# Patient Record
Sex: MALE | Race: OTHER | Employment: FULL TIME | URBAN - METROPOLITAN AREA
[De-identification: names, ages, dates, MRNs, and addresses within clinical notes are randomized per-mention and may not be internally consistent; named-entity substitution may affect disease eponyms.]

---

## 2017-05-16 ENCOUNTER — TELEPHONE (OUTPATIENT)
Dept: SURGERY | Facility: CLINIC | Age: 42
End: 2017-05-16

## 2017-05-16 ENCOUNTER — OFFICE VISIT (OUTPATIENT)
Dept: SURGERY | Facility: CLINIC | Age: 42
End: 2017-05-16

## 2017-05-16 ENCOUNTER — APPOINTMENT (OUTPATIENT)
Dept: LAB | Facility: HOSPITAL | Age: 42
End: 2017-05-16
Attending: UROLOGY
Payer: COMMERCIAL

## 2017-05-16 ENCOUNTER — LAB ENCOUNTER (OUTPATIENT)
Dept: LAB | Facility: HOSPITAL | Age: 42
End: 2017-05-16
Attending: UROLOGY
Payer: COMMERCIAL

## 2017-05-16 VITALS
HEART RATE: 96 BPM | BODY MASS INDEX: 45.91 KG/M2 | WEIGHT: 310 LBS | DIASTOLIC BLOOD PRESSURE: 90 MMHG | RESPIRATION RATE: 16 BRPM | SYSTOLIC BLOOD PRESSURE: 138 MMHG | TEMPERATURE: 98 F | HEIGHT: 69 IN

## 2017-05-16 DIAGNOSIS — E66.01 MORBID OBESITY WITH BMI OF 45.0-49.9, ADULT (HCC): ICD-10-CM

## 2017-05-16 DIAGNOSIS — Z01.818 PREOP EXAMINATION: ICD-10-CM

## 2017-05-16 DIAGNOSIS — R35.1 NOCTURIA: ICD-10-CM

## 2017-05-16 DIAGNOSIS — R35.1 NOCTURIA: Primary | ICD-10-CM

## 2017-05-16 DIAGNOSIS — N47.1 PHIMOSIS: ICD-10-CM

## 2017-05-16 DIAGNOSIS — G47.30 SLEEP APNEA, UNSPECIFIED TYPE: ICD-10-CM

## 2017-05-16 PROCEDURE — 85025 COMPLETE CBC W/AUTO DIFF WBC: CPT

## 2017-05-16 PROCEDURE — 81001 URINALYSIS AUTO W/SCOPE: CPT

## 2017-05-16 PROCEDURE — 36415 COLL VENOUS BLD VENIPUNCTURE: CPT

## 2017-05-16 PROCEDURE — 93005 ELECTROCARDIOGRAM TRACING: CPT

## 2017-05-16 PROCEDURE — 80048 BASIC METABOLIC PNL TOTAL CA: CPT

## 2017-05-16 PROCEDURE — 99244 OFF/OP CNSLTJ NEW/EST MOD 40: CPT | Performed by: UROLOGY

## 2017-05-16 PROCEDURE — 99212 OFFICE O/P EST SF 10 MIN: CPT | Performed by: UROLOGY

## 2017-05-16 PROCEDURE — 93010 ELECTROCARDIOGRAM REPORT: CPT | Performed by: UROLOGY

## 2017-05-16 RX ORDER — LISINOPRIL AND HYDROCHLOROTHIAZIDE 12.5; 1 MG/1; MG/1
TABLET ORAL
Refills: 2 | COMMUNITY
Start: 2017-05-02

## 2017-05-16 RX ORDER — CANAGLIFLOZIN AND METFORMIN HYDROCHLORIDE 50; 1000 MG/1; MG/1
TABLET, FILM COATED ORAL
COMMUNITY
Start: 2017-05-14 | End: 2019-05-01

## 2017-05-16 NOTE — PROGRESS NOTES
Nel Lopez is a 46year old male. HPI:   Patient presents with:  Circumcision    History provided by pt. 1. Voiding difficulties  His American urologic Association voiding score is  8, moderate  voiding dysfunction category.   Nothing makes prob 0.0 oz/week       0 Standard drinks or equivalent per week       Medications (Active prior to today's visit):    Current Outpatient Prescriptions:  Lisinopril-Hydrochlorothiazide 10-12.5 MG Oral Tab TK 1 T PO QD Disp:  Rfl: 2   INVOKAMET  MG Oral Ta noted  Respiratory: normal to inspection lungs are clear to auscultation bilaterally normal respiratory effort  Cardiac: regular rate and rhythm ; normal S, S2 ;   Murmurs none  Abdomen: soft, non-tender, non-distended, no organomegaly noted, no masses  Ge for general anesthesia in light of his morbid obesity, sleep apnea and DM.  I fully explained to patient the benefits, risks, complications, side effects, reasons for, nature of, alternatives to the above treatment and I answered questions on treatment; thiago procedure         Franchesca Sanchez  is a very pleasant patient and I thoroughly enjoyed evaluating him  in consultation. I thank you for sending the patient to see me.   I will do my best to keep you informed of  all significant urological findings and developments

## 2017-05-16 NOTE — TELEPHONE ENCOUNTER
Patient seen in office today scheduled for circumcision, Tuesday 06/06/17 @ 9:00, [facility]/outpatient.

## 2017-05-16 NOTE — PATIENT INSTRUCTIONS
1. Proceed with plans for circumcision under general anesthesia at [facility] will plan that you would go home the same day although there is a small chance that you may have to stay longer than expected because of your sleep apnea.

## 2017-05-17 NOTE — TELEPHONE ENCOUNTER
Spoke with [employer] representative with [employer] to obtain prior authorization, Ref# [id number] was given, also informed to fax clinicals over to [employer] at [phone], thanks

## 2017-05-30 ENCOUNTER — TELEPHONE (OUTPATIENT)
Dept: SURGERY | Facility: CLINIC | Age: 42
End: 2017-05-30

## 2017-05-30 NOTE — TELEPHONE ENCOUNTER
Phoned pt and spoke with him. Read to him Dr. Cecilia Emanuel note as outlined below in this encounter, in it's entirety. Also advised that he get letter with 's medical clearance , to provided to  , prior to procedure.   Pt verbalized understanding

## 2017-06-01 ENCOUNTER — TELEPHONE (OUTPATIENT)
Dept: SURGERY | Facility: CLINIC | Age: 42
End: 2017-06-01

## 2017-06-01 NOTE — TELEPHONE ENCOUNTER
pt called. He spoke with his PCP Dr. Wes Doherty and is calling the RN back as instructed. Please call.

## 2017-06-01 NOTE — TELEPHONE ENCOUNTER
Spoke with pt and determined that he saw Dr. Jessica Laughlin and he is sending him for an Echocardiogram on Sat. 6/3 and Dr. Jessica Laughlin would also like our office to fax over the recent pre-op labs pt had done.  I told pt that I will fax those labs and also send a msg to [address] [place] [address]

## 2017-06-03 ENCOUNTER — HOSPITAL ENCOUNTER (OUTPATIENT)
Dept: CV DIAGNOSTICS | Facility: HOSPITAL | Age: 42
Discharge: HOME | End: 2017-06-03
Attending: FAMILY MEDICINE
Payer: COMMERCIAL

## 2017-06-03 DIAGNOSIS — Z01.818 PREOP EXAMINATION: ICD-10-CM

## 2017-06-03 DIAGNOSIS — R94.31 ABNORMAL ELECTROCARDIOGRAM (ECG) (EKG): ICD-10-CM

## 2017-06-03 PROCEDURE — 93306 TTE W/DOPPLER COMPLETE: CPT | Performed by: FAMILY MEDICINE

## 2017-06-05 NOTE — TELEPHONE ENCOUNTER
per your request spoke with Vivi Odonnell at [address] [place] [address] Pkwy' office informed that I would be faxing patient' Echo results.  Thanks Leidy

## 2017-06-05 NOTE — TELEPHONE ENCOUNTER
I spoke with pt and informed him that Molly Branch showed the Echo result to [address] and he did not have a problem with the results and he asked her to fax it to Dr. Romero Mejia office also which she did.  He said that there is another Doc covering for Dr. Diana Saenz today and he

## 2017-06-05 NOTE — TELEPHONE ENCOUNTER
Tana Hammond,   as soon as possible please call hospital echocardiogram department (heart cardiac ultrasound); this patient had cardiac 2D echo (ultrasound) Saturday, 6/3/17; patient has surgery tomorrow Tuesday, 6/6/17 and the study has been red; please asked th

## 2017-06-06 ENCOUNTER — ANESTHESIA EVENT (OUTPATIENT)
Dept: SURGERY | Facility: HOSPITAL | Age: 42
End: 2017-06-06
Payer: COMMERCIAL

## 2017-06-06 ENCOUNTER — TELEPHONE (OUTPATIENT)
Dept: SURGERY | Facility: CLINIC | Age: 42
End: 2017-06-06

## 2017-06-06 ENCOUNTER — HOSPITAL ENCOUNTER (OUTPATIENT)
Facility: HOSPITAL | Age: 42
Setting detail: HOSPITAL OUTPATIENT SURGERY
Discharge: HOME | End: 2017-06-06
Attending: UROLOGY | Admitting: UROLOGY
Payer: COMMERCIAL

## 2017-06-06 ENCOUNTER — SURGERY (OUTPATIENT)
Age: 42
End: 2017-06-06

## 2017-06-06 ENCOUNTER — ANESTHESIA (OUTPATIENT)
Dept: SURGERY | Facility: HOSPITAL | Age: 42
End: 2017-06-06
Payer: COMMERCIAL

## 2017-06-06 VITALS
SYSTOLIC BLOOD PRESSURE: 133 MMHG | OXYGEN SATURATION: 95 % | WEIGHT: 303 LBS | RESPIRATION RATE: 16 BRPM | HEIGHT: 69 IN | TEMPERATURE: 99 F | BODY MASS INDEX: 44.88 KG/M2 | DIASTOLIC BLOOD PRESSURE: 65 MMHG | HEART RATE: 87 BPM

## 2017-06-06 DIAGNOSIS — N47.1 PHIMOSIS: Primary | ICD-10-CM

## 2017-06-06 PROCEDURE — 54161 CIRCUM 28 DAYS OR OLDER: CPT | Performed by: UROLOGY

## 2017-06-06 PROCEDURE — 0VTTXZZ RESECTION OF PREPUCE, EXTERNAL APPROACH: ICD-10-PCS | Performed by: UROLOGY

## 2017-06-06 RX ORDER — ACETAMINOPHEN 325 MG/1
650 TABLET ORAL ONCE
Status: COMPLETED | OUTPATIENT
Start: 2017-06-06 | End: 2017-06-06

## 2017-06-06 RX ORDER — SODIUM CHLORIDE, SODIUM LACTATE, POTASSIUM CHLORIDE, CALCIUM CHLORIDE 600; 310; 30; 20 MG/100ML; MG/100ML; MG/100ML; MG/100ML
INJECTION, SOLUTION INTRAVENOUS CONTINUOUS
Status: DISCONTINUED | OUTPATIENT
Start: 2017-06-06 | End: 2017-06-06

## 2017-06-06 RX ORDER — MORPHINE SULFATE 2 MG/ML
2 INJECTION, SOLUTION INTRAMUSCULAR; INTRAVENOUS EVERY 10 MIN PRN
Status: DISCONTINUED | OUTPATIENT
Start: 2017-06-06 | End: 2017-06-06

## 2017-06-06 RX ORDER — BUPIVACAINE HYDROCHLORIDE 5 MG/ML
INJECTION, SOLUTION EPIDURAL; INTRACAUDAL AS NEEDED
Status: DISCONTINUED | OUTPATIENT
Start: 2017-06-06 | End: 2017-06-06 | Stop reason: HOSPADM

## 2017-06-06 RX ORDER — NALOXONE HYDROCHLORIDE 0.4 MG/ML
80 INJECTION, SOLUTION INTRAMUSCULAR; INTRAVENOUS; SUBCUTANEOUS AS NEEDED
Status: DISCONTINUED | OUTPATIENT
Start: 2017-06-06 | End: 2017-06-06

## 2017-06-06 RX ORDER — HYDROCODONE BITARTRATE AND ACETAMINOPHEN 5; 325 MG/1; MG/1
2 TABLET ORAL AS NEEDED
Status: DISCONTINUED | OUTPATIENT
Start: 2017-06-06 | End: 2017-06-06

## 2017-06-06 RX ORDER — MIDAZOLAM HYDROCHLORIDE 1 MG/ML
INJECTION INTRAMUSCULAR; INTRAVENOUS AS NEEDED
Status: DISCONTINUED | OUTPATIENT
Start: 2017-06-06 | End: 2017-06-06 | Stop reason: SURG

## 2017-06-06 RX ORDER — ONDANSETRON 2 MG/ML
4 INJECTION INTRAMUSCULAR; INTRAVENOUS ONCE AS NEEDED
Status: DISCONTINUED | OUTPATIENT
Start: 2017-06-06 | End: 2017-06-06

## 2017-06-06 RX ORDER — HYDROCODONE BITARTRATE AND ACETAMINOPHEN 5; 325 MG/1; MG/1
2 TABLET ORAL EVERY 4 HOURS PRN
Status: DISCONTINUED | OUTPATIENT
Start: 2017-06-06 | End: 2017-06-06

## 2017-06-06 RX ORDER — HYDROCODONE BITARTRATE AND ACETAMINOPHEN 5; 325 MG/1; MG/1
1 TABLET ORAL EVERY 4 HOURS PRN
Status: DISCONTINUED | OUTPATIENT
Start: 2017-06-06 | End: 2017-06-06

## 2017-06-06 RX ORDER — DIAPER,BRIEF,INFANT-TODD,DISP
EACH MISCELLANEOUS AS NEEDED
Status: DISCONTINUED | OUTPATIENT
Start: 2017-06-06 | End: 2017-06-06 | Stop reason: HOSPADM

## 2017-06-06 RX ORDER — SUCCINYLCHOLINE CHLORIDE 20 MG/ML
INJECTION INTRAMUSCULAR; INTRAVENOUS AS NEEDED
Status: DISCONTINUED | OUTPATIENT
Start: 2017-06-06 | End: 2017-06-06 | Stop reason: SURG

## 2017-06-06 RX ORDER — ONDANSETRON 2 MG/ML
INJECTION INTRAMUSCULAR; INTRAVENOUS AS NEEDED
Status: DISCONTINUED | OUTPATIENT
Start: 2017-06-06 | End: 2017-06-06 | Stop reason: SURG

## 2017-06-06 RX ORDER — METOCLOPRAMIDE 10 MG/1
10 TABLET ORAL ONCE
Status: COMPLETED | OUTPATIENT
Start: 2017-06-06 | End: 2017-06-06

## 2017-06-06 RX ORDER — HYDROCODONE BITARTRATE AND ACETAMINOPHEN 5; 325 MG/1; MG/1
1 TABLET ORAL EVERY 6 HOURS PRN
Qty: 20 TABLET | Refills: 0 | Status: SHIPPED | OUTPATIENT
Start: 2017-06-06 | End: 2017-06-16

## 2017-06-06 RX ORDER — SODIUM CHLORIDE, SODIUM LACTATE, POTASSIUM CHLORIDE, CALCIUM CHLORIDE 600; 310; 30; 20 MG/100ML; MG/100ML; MG/100ML; MG/100ML
INJECTION, SOLUTION INTRAVENOUS CONTINUOUS PRN
Status: DISCONTINUED | OUTPATIENT
Start: 2017-06-06 | End: 2017-06-06 | Stop reason: SURG

## 2017-06-06 RX ORDER — HYDROCODONE BITARTRATE AND ACETAMINOPHEN 5; 325 MG/1; MG/1
1 TABLET ORAL AS NEEDED
Status: DISCONTINUED | OUTPATIENT
Start: 2017-06-06 | End: 2017-06-06

## 2017-06-06 RX ORDER — DEXAMETHASONE SODIUM PHOSPHATE 4 MG/ML
VIAL (ML) INJECTION AS NEEDED
Status: DISCONTINUED | OUTPATIENT
Start: 2017-06-06 | End: 2017-06-06 | Stop reason: SURG

## 2017-06-06 RX ORDER — FAMOTIDINE 20 MG/1
20 TABLET ORAL ONCE
Status: COMPLETED | OUTPATIENT
Start: 2017-06-06 | End: 2017-06-06

## 2017-06-06 RX ORDER — GLYCOPYRROLATE 0.2 MG/ML
INJECTION INTRAMUSCULAR; INTRAVENOUS AS NEEDED
Status: DISCONTINUED | OUTPATIENT
Start: 2017-06-06 | End: 2017-06-06 | Stop reason: SURG

## 2017-06-06 RX ORDER — HYDROMORPHONE HYDROCHLORIDE 1 MG/ML
0.6 INJECTION, SOLUTION INTRAMUSCULAR; INTRAVENOUS; SUBCUTANEOUS EVERY 5 MIN PRN
Status: DISCONTINUED | OUTPATIENT
Start: 2017-06-06 | End: 2017-06-06

## 2017-06-06 RX ORDER — ROCURONIUM BROMIDE 10 MG/ML
INJECTION, SOLUTION INTRAVENOUS AS NEEDED
Status: DISCONTINUED | OUTPATIENT
Start: 2017-06-06 | End: 2017-06-06 | Stop reason: SURG

## 2017-06-06 RX ORDER — HYDROMORPHONE HYDROCHLORIDE 1 MG/ML
0.2 INJECTION, SOLUTION INTRAMUSCULAR; INTRAVENOUS; SUBCUTANEOUS EVERY 5 MIN PRN
Status: DISCONTINUED | OUTPATIENT
Start: 2017-06-06 | End: 2017-06-06

## 2017-06-06 RX ORDER — MORPHINE SULFATE 10 MG/ML
6 INJECTION, SOLUTION INTRAMUSCULAR; INTRAVENOUS EVERY 10 MIN PRN
Status: DISCONTINUED | OUTPATIENT
Start: 2017-06-06 | End: 2017-06-06

## 2017-06-06 RX ORDER — ACETAMINOPHEN 325 MG/1
650 TABLET ORAL EVERY 4 HOURS PRN
Status: DISCONTINUED | OUTPATIENT
Start: 2017-06-06 | End: 2017-06-06

## 2017-06-06 RX ORDER — LIDOCAINE HYDROCHLORIDE 10 MG/ML
INJECTION, SOLUTION EPIDURAL; INFILTRATION; INTRACAUDAL; PERINEURAL AS NEEDED
Status: DISCONTINUED | OUTPATIENT
Start: 2017-06-06 | End: 2017-06-06 | Stop reason: SURG

## 2017-06-06 RX ORDER — HYDROMORPHONE HYDROCHLORIDE 1 MG/ML
0.4 INJECTION, SOLUTION INTRAMUSCULAR; INTRAVENOUS; SUBCUTANEOUS EVERY 5 MIN PRN
Status: DISCONTINUED | OUTPATIENT
Start: 2017-06-06 | End: 2017-06-06

## 2017-06-06 RX ORDER — MORPHINE SULFATE 4 MG/ML
4 INJECTION, SOLUTION INTRAMUSCULAR; INTRAVENOUS EVERY 10 MIN PRN
Status: DISCONTINUED | OUTPATIENT
Start: 2017-06-06 | End: 2017-06-06

## 2017-06-06 RX ADMIN — SUCCINYLCHOLINE CHLORIDE 160 MG: 20 INJECTION INTRAMUSCULAR; INTRAVENOUS at 10:41:00

## 2017-06-06 RX ADMIN — GLYCOPYRROLATE 0.2 MG: 0.2 INJECTION INTRAMUSCULAR; INTRAVENOUS at 10:41:00

## 2017-06-06 RX ADMIN — DEXAMETHASONE SODIUM PHOSPHATE 4 MG: 4 MG/ML VIAL (ML) INJECTION at 10:40:00

## 2017-06-06 RX ADMIN — MIDAZOLAM HYDROCHLORIDE 2 MG: 1 INJECTION INTRAMUSCULAR; INTRAVENOUS at 10:41:00

## 2017-06-06 RX ADMIN — SODIUM CHLORIDE, SODIUM LACTATE, POTASSIUM CHLORIDE, CALCIUM CHLORIDE: 600; 310; 30; 20 INJECTION, SOLUTION INTRAVENOUS at 12:46:00

## 2017-06-06 RX ADMIN — SODIUM CHLORIDE, SODIUM LACTATE, POTASSIUM CHLORIDE, CALCIUM CHLORIDE: 600; 310; 30; 20 INJECTION, SOLUTION INTRAVENOUS at 10:41:00

## 2017-06-06 RX ADMIN — LIDOCAINE HYDROCHLORIDE 50 MG: 10 INJECTION, SOLUTION EPIDURAL; INFILTRATION; INTRACAUDAL; PERINEURAL at 10:41:00

## 2017-06-06 RX ADMIN — ROCURONIUM BROMIDE 10 MG: 10 INJECTION, SOLUTION INTRAVENOUS at 10:41:00

## 2017-06-06 RX ADMIN — ONDANSETRON 4 MG: 2 INJECTION INTRAMUSCULAR; INTRAVENOUS at 10:40:00

## 2017-06-06 NOTE — ANESTHESIA POSTPROCEDURE EVALUATION
Patient: Chelsy Marrero    Procedure Summary     Date Anesthesia Start Anesthesia Stop Room / Location    06/06/17 1037  300 [facility] MAIN OR 16 / 300 [facility] MAIN OR       Procedure Diagnosis Surgeon Responsible Provider    CIRCUMCISION ADULT (N/A Penis) (Circumcision (

## 2017-06-06 NOTE — INTERVAL H&P NOTE
Pre-op Diagnosis: Circumcision (phimosis)     The above referenced H&P was reviewed by Karlie Boone MD on 6/6/2017, the patient was examined and no significant changes have occurred in the patient's condition since the H&P was performed.   I discussed

## 2017-06-06 NOTE — TELEPHONE ENCOUNTER
I performed circumcision today under general anesthesia. The following are discharge instructions =    1. Patient to apply bacitracin ointment around the incision line 4 times a day for 10 days   2.  A few times every day, to use his hand on each side of p

## 2017-06-06 NOTE — H&P
Progress Notes by Suleiman Kelly MD at 5/16/2017  4:24 PM      Author: Suleiman Kelly MD Service: (none) Author Type: Physician     Filed: 5/22/2017  1:05 PM Note Time: 5/16/2017  4:24 PM Status: Signed     : Suleiman Kelly MD (Physician) HISTORY:  Past Medical History    Diagnosis  Date    •  Essential hypertension      •  Diabetes (HCC)          No past surgical history on file.    Family History    Problem  Relation  Age of Onset    [de-identified] [place] Mother      •  Diabetes  Brother    wheezing        PHYSICAL EXAM:    Constitutional: appears well hydrated alert and responsive no acute distress noted  Neurological: Oriented to time, place, person with normal affect  Exam appropriate for age; patellar reflexes diminished 1/2+ bilaterally foreskin. He recalls episode where foreskin split and bled. From hx pt gives there is suspicion of preceeding fungal infection in light of his diabetes. Severe himosis observed on physical examination; I was unable to retract foreskin at all.  Discussed lupillo midnight, night before the procedure    5.  Please shave the penis, the area around the penis, all of the pubic area when you wake up on the morning of the procedure--use a mirror    6.  The waking up at night/nocturia and also avoiding problems, urinalysi

## 2017-06-06 NOTE — ANESTHESIA PREPROCEDURE EVALUATION
Anesthesia PreOp Note    HPI:     Scar Betancourt is a 46year old male who presents for preoperative consultation requested by: Sharon Aguilera MD    Date of Surgery: 6/6/2017    Procedure(s):  CIRCUMCISION ADULT  Indication: Circumcision (phimosis) MCH 30.9 05/16/2017   MCHC 33.3 05/16/2017   RDW 13.8 05/16/2017    05/16/2017   MPV 10.1 05/16/2017       Lab Results  Component Value Date    05/16/2017   K 4.2 05/16/2017   CL 99 05/16/2017   CO2 26 05/16/2017   BUN 13 05/16/2017   CREATS

## 2017-06-06 NOTE — OPERATIVE REPORT
[facility]    PATIENT'S NAME: Clinton Brunilda   ATTENDING PHYSICIAN: Lee Ram MD   OPERATING PHYSICIAN: Lee Ram MD   PATIENT ACCOUNT#:   [id number]    LOCATION:  [facility] [address] PACU [facility] 10  MEDICAL RECORD #:   [id number] pubic fat pad and retracted it upward, and this was done in sterile technique. This allowed the penis to protrude more so we could perform the operation. The foreskin could not be pulled back at all.   I made an incision in the scar of the opening of the

## 2019-04-17 ENCOUNTER — TELEPHONE (OUTPATIENT)
Dept: SURGERY | Facility: CLINIC | Age: 44
End: 2019-04-17

## 2019-04-29 ENCOUNTER — TELEPHONE (OUTPATIENT)
Dept: SURGERY | Facility: CLINIC | Age: 44
End: 2019-04-29

## 2019-04-29 NOTE — TELEPHONE ENCOUNTER
4/29/19 @ 2:27pm  Spoke to Mahesh at [place], [phone], [id number]. He verified that patient has following benefits for Bariatric services:   · No weight management criteria. · No HAFSA/Blue Distinction required.    · PHUONG (THU#[id number]) DX E66.01   · Die

## 2019-05-01 ENCOUNTER — OFFICE VISIT (OUTPATIENT)
Dept: SURGERY | Facility: CLINIC | Age: 44
End: 2019-05-01
Payer: COMMERCIAL

## 2019-05-01 ENCOUNTER — DOCUMENTATION ONLY (OUTPATIENT)
Dept: SURGERY | Facility: CLINIC | Age: 44
End: 2019-05-01

## 2019-05-01 VITALS
BODY MASS INDEX: 47.19 KG/M2 | HEIGHT: 68.5 IN | SYSTOLIC BLOOD PRESSURE: 133 MMHG | DIASTOLIC BLOOD PRESSURE: 75 MMHG | WEIGHT: 315 LBS | RESPIRATION RATE: 16 BRPM | HEART RATE: 87 BPM

## 2019-05-01 DIAGNOSIS — E11.8 TYPE 2 DIABETES MELLITUS WITH COMPLICATION, WITHOUT LONG-TERM CURRENT USE OF INSULIN (HCC): ICD-10-CM

## 2019-05-01 DIAGNOSIS — I10 HYPERTENSION, UNSPECIFIED TYPE: ICD-10-CM

## 2019-05-01 DIAGNOSIS — E66.01 MORBID OBESITY WITH BMI OF 45.0-49.9, ADULT (HCC): Primary | ICD-10-CM

## 2019-05-01 PROBLEM — E11.9 DIABETES (HCC): Status: ACTIVE | Noted: 2019-05-01

## 2019-05-01 PROBLEM — E78.5 HYPERLIPIDEMIA: Status: ACTIVE | Noted: 2019-05-01

## 2019-05-01 RX ORDER — ATORVASTATIN CALCIUM 20 MG/1
20 TABLET, FILM COATED ORAL DAILY
Refills: 1 | COMMUNITY
Start: 2019-02-12

## 2019-05-01 RX ORDER — CANAGLIFLOZIN AND METFORMIN HYDROCHLORIDE 150; 1000 MG/1; MG/1
TABLET, FILM COATED, EXTENDED RELEASE ORAL DAILY
Refills: 2 | COMMUNITY
Start: 2019-02-13

## 2019-05-01 RX ORDER — DULAGLUTIDE 1.5 MG/.5ML
1.5 INJECTION, SOLUTION SUBCUTANEOUS AS NEEDED
Refills: 2 | COMMUNITY
Start: 2019-02-13

## 2019-05-01 RX ORDER — GLIMEPIRIDE 4 MG/1
4 TABLET ORAL DAILY
Refills: 1 | COMMUNITY
Start: 2019-04-22

## 2019-05-01 NOTE — H&P
[address] [address]  Dept: [phone]    5/1/2019  Bariatric New Patient Evaluation    Chief Complaint:  Morbid obesity     History of Present Illness: Smokeless tobacco: Never Used    Substance and Sexual Activity      Alcohol use: No        Alcohol/week: 0.0 oz      Drug use: No      Medications:   Current Outpatient Medications:   •  atorvastatin 20 MG Oral Tab, Take 20 mg by mouth daily. , Disp: , Rfl: pulmonary, dietician, and psychology evaluations. Baseline laboratory studies will be checked. The patient will be referred to Dr. Sabine Nair for medical bariatric care. I will see the patient again in 4 weeks to monitor their progress.   I will likely prefe

## 2019-05-01 NOTE — PROGRESS NOTES
Oriented pt to the bariatric program; provided/reviewed bariatric packet of info, time line, referrals, etc; pt verbalized understanding; will be attending the bariatric seminar on 5/13/19.

## 2021-03-27 ENCOUNTER — LAB SERVICES (OUTPATIENT)
Dept: LAB | Age: 46
End: 2021-03-27

## 2021-03-27 ENCOUNTER — LAB REQUISITION (OUTPATIENT)
Dept: LAB | Age: 46
End: 2021-03-27

## 2021-03-27 DIAGNOSIS — E11.9 TYPE 2 DIABETES MELLITUS WITHOUT COMPLICATIONS (CMD): ICD-10-CM

## 2021-03-27 DIAGNOSIS — Z00.00 ENCOUNTER FOR GENERAL ADULT MEDICAL EXAMINATION WITHOUT ABNORMAL FINDINGS: ICD-10-CM

## 2021-03-27 LAB
ALBUMIN SERPL-MCNC: 3.8 G/DL (ref 3.6–5.1)
ALBUMIN/GLOB SERPL: 0.9 {RATIO} (ref 1–2.4)
ALP SERPL-CCNC: 101 UNITS/L (ref 45–117)
ALT SERPL-CCNC: 146 UNITS/L
ANION GAP SERPL CALC-SCNC: 10 MMOL/L (ref 10–20)
AST SERPL-CCNC: 93 UNITS/L
BASOPHILS # BLD: 0 K/MCL (ref 0–0.3)
BASOPHILS NFR BLD: 0 %
BILIRUB SERPL-MCNC: 0.8 MG/DL (ref 0.2–1)
BUN SERPL-MCNC: 12 MG/DL (ref 6–20)
BUN/CREAT SERPL: 16 (ref 7–25)
CALCIUM SERPL-MCNC: 9.3 MG/DL (ref 8.4–10.2)
CHLORIDE SERPL-SCNC: 104 MMOL/L (ref 98–107)
CHOLEST SERPL-MCNC: 114 MG/DL
CHOLEST/HDLC SERPL: 2.8 {RATIO}
CO2 SERPL-SCNC: 28 MMOL/L (ref 21–32)
CREAT SERPL-MCNC: 0.75 MG/DL (ref 0.67–1.17)
CREAT UR-MCNC: 56 MG/DL
DEPRECATED RDW RBC: 44.7 FL (ref 39–50)
EOSINOPHIL # BLD: 0.2 K/MCL (ref 0–0.5)
EOSINOPHIL NFR BLD: 2 %
ERYTHROCYTE [DISTWIDTH] IN BLOOD: 13 % (ref 11–15)
FASTING DURATION TIME PATIENT: 12 HOURS
FASTING DURATION TIME PATIENT: 12 HOURS
GFR SERPLBLD BASED ON 1.73 SQ M-ARVRAT: >90 ML/MIN/1.73M2
GLOBULIN SER-MCNC: 4.1 G/DL (ref 2–4)
GLUCOSE SERPL-MCNC: 130 MG/DL (ref 65–99)
HBA1C MFR BLD: 11.6 % (ref 4.5–5.6)
HCT VFR BLD CALC: 48 % (ref 39–51)
HDLC SERPL-MCNC: 41 MG/DL
HGB BLD-MCNC: 15.7 G/DL (ref 13–17)
IMM GRANULOCYTES # BLD AUTO: 0 K/MCL (ref 0–0.2)
IMM GRANULOCYTES # BLD: 1 %
LDLC SERPL CALC-MCNC: 48 MG/DL
LYMPHOCYTES # BLD: 2.1 K/MCL (ref 1–4.8)
LYMPHOCYTES NFR BLD: 26 %
MCH RBC QN AUTO: 30.7 PG (ref 26–34)
MCHC RBC AUTO-ENTMCNC: 32.7 G/DL (ref 32–36.5)
MCV RBC AUTO: 93.8 FL (ref 78–100)
MICROALBUMIN UR-MCNC: 7.35 MG/DL
MICROALBUMIN/CREAT UR: 131.3 MG/G
MONOCYTES # BLD: 0.5 K/MCL (ref 0.3–0.9)
MONOCYTES NFR BLD: 6 %
NEUTROPHILS # BLD: 5.3 K/MCL (ref 1.8–7.7)
NEUTROPHILS NFR BLD: 65 %
NONHDLC SERPL-MCNC: 73 MG/DL
NRBC BLD MANUAL-RTO: 0 /100 WBC
PLATELET # BLD AUTO: 196 K/MCL (ref 140–450)
POTASSIUM SERPL-SCNC: 4.3 MMOL/L (ref 3.4–5.1)
PROT SERPL-MCNC: 7.9 G/DL (ref 6.4–8.2)
RBC # BLD: 5.12 MIL/MCL (ref 4.5–5.9)
SODIUM SERPL-SCNC: 138 MMOL/L (ref 135–145)
TRIGL SERPL-MCNC: 127 MG/DL
WBC # BLD: 8.2 K/MCL (ref 4.2–11)

## 2021-03-27 PROCEDURE — 83520 IMMUNOASSAY QUANT NOS NONAB: CPT | Performed by: CLINICAL MEDICAL LABORATORY

## 2021-03-27 PROCEDURE — 82570 ASSAY OF URINE CREATININE: CPT | Performed by: CLINICAL MEDICAL LABORATORY

## 2021-03-27 PROCEDURE — 85025 COMPLETE CBC W/AUTO DIFF WBC: CPT | Performed by: CLINICAL MEDICAL LABORATORY

## 2021-03-27 PROCEDURE — 82043 UR ALBUMIN QUANTITATIVE: CPT | Performed by: CLINICAL MEDICAL LABORATORY

## 2021-03-27 PROCEDURE — 83036 HEMOGLOBIN GLYCOSYLATED A1C: CPT | Performed by: CLINICAL MEDICAL LABORATORY

## 2021-03-27 PROCEDURE — 80053 COMPREHEN METABOLIC PANEL: CPT | Performed by: CLINICAL MEDICAL LABORATORY

## 2021-03-27 PROCEDURE — 80061 LIPID PANEL: CPT | Performed by: CLINICAL MEDICAL LABORATORY

## 2021-03-27 PROCEDURE — 36415 COLL VENOUS BLD VENIPUNCTURE: CPT | Performed by: CLINICAL MEDICAL LABORATORY

## 2021-03-30 LAB — TSH RECEP AB SER-ACNC: 1.14 IU/L

## 2023-06-04 NOTE — TELEPHONE ENCOUNTER
----- Message from Aimee Connolly MD sent at 5/30/2017  6:44 AM CDT -----  Nurses, please call patient; EKG abnormal showing possible heart attack in the past; patient needs to see primary physician Dr. Koehler Check as soon as possible for medical clearance for Provider continues at bedside placing stitches.

## 2025-03-20 ENCOUNTER — APPOINTMENT (OUTPATIENT)
Dept: ULTRASOUND IMAGING | Age: 50
End: 2025-03-20
Attending: EMERGENCY MEDICINE
Payer: COMMERCIAL

## 2025-03-20 ENCOUNTER — APPOINTMENT (OUTPATIENT)
Dept: GENERAL RADIOLOGY | Age: 50
End: 2025-03-20
Attending: EMERGENCY MEDICINE
Payer: COMMERCIAL

## 2025-03-20 ENCOUNTER — APPOINTMENT (OUTPATIENT)
Dept: CT IMAGING | Age: 50
End: 2025-03-20
Attending: EMERGENCY MEDICINE
Payer: COMMERCIAL

## 2025-03-20 ENCOUNTER — HOSPITAL ENCOUNTER (EMERGENCY)
Age: 50
Discharge: HOME | End: 2025-03-20
Attending: EMERGENCY MEDICINE
Payer: COMMERCIAL

## 2025-03-20 VITALS
OXYGEN SATURATION: 99 % | TEMPERATURE: 98 F | HEIGHT: 69 IN | HEART RATE: 77 BPM | BODY MASS INDEX: 41.47 KG/M2 | SYSTOLIC BLOOD PRESSURE: 156 MMHG | DIASTOLIC BLOOD PRESSURE: 88 MMHG | WEIGHT: 280 LBS | RESPIRATION RATE: 18 BRPM

## 2025-03-20 DIAGNOSIS — R73.9 HYPERGLYCEMIA: Primary | ICD-10-CM

## 2025-03-20 DIAGNOSIS — R60.9 EDEMA, UNSPECIFIED TYPE: ICD-10-CM

## 2025-03-20 DIAGNOSIS — R51.9 NONINTRACTABLE HEADACHE, UNSPECIFIED CHRONICITY PATTERN, UNSPECIFIED HEADACHE TYPE: ICD-10-CM

## 2025-03-20 LAB
ALBUMIN SERPL-MCNC: 3.9 G/DL (ref 3.2–4.8)
ALBUMIN/GLOB SERPL: 1.1 {RATIO} (ref 1–2)
ALP LIVER SERPL-CCNC: 114 U/L
ALT SERPL-CCNC: 65 U/L
ANION GAP SERPL CALC-SCNC: 6 MMOL/L (ref 0–18)
AST SERPL-CCNC: 40 U/L (ref ?–34)
ATRIAL RATE: 80 BPM
BASOPHILS # BLD AUTO: 0.04 X10(3) UL (ref 0–0.2)
BASOPHILS NFR BLD AUTO: 0.5 %
BILIRUB SERPL-MCNC: 0.6 MG/DL (ref 0.3–1.2)
BUN BLD-MCNC: 10 MG/DL (ref 9–23)
CALCIUM BLD-MCNC: 9.1 MG/DL (ref 8.7–10.6)
CHLORIDE SERPL-SCNC: 102 MMOL/L (ref 98–112)
CO2 SERPL-SCNC: 26 MMOL/L (ref 21–32)
CREAT BLD-MCNC: 0.69 MG/DL
EGFRCR SERPLBLD CKD-EPI 2021: 115 ML/MIN/1.73M2 (ref 60–?)
EOSINOPHIL # BLD AUTO: 0.16 X10(3) UL (ref 0–0.7)
EOSINOPHIL NFR BLD AUTO: 1.9 %
ERYTHROCYTE [DISTWIDTH] IN BLOOD BY AUTOMATED COUNT: 12.9 %
GLOBULIN PLAS-MCNC: 3.4 G/DL (ref 2–3.5)
GLUCOSE BLD-MCNC: 270 MG/DL (ref 70–99)
GLUCOSE BLD-MCNC: 286 MG/DL (ref 70–99)
GLUCOSE BLD-MCNC: 326 MG/DL (ref 70–99)
HCT VFR BLD AUTO: 42.9 %
HGB BLD-MCNC: 15.3 G/DL
IMM GRANULOCYTES # BLD AUTO: 0.04 X10(3) UL (ref 0–1)
IMM GRANULOCYTES NFR BLD: 0.5 %
LYMPHOCYTES # BLD AUTO: 2.27 X10(3) UL (ref 1–4)
LYMPHOCYTES NFR BLD AUTO: 26.9 %
MCH RBC QN AUTO: 31.5 PG (ref 26–34)
MCHC RBC AUTO-ENTMCNC: 35.7 G/DL (ref 31–37)
MCV RBC AUTO: 88.5 FL
MONOCYTES # BLD AUTO: 0.51 X10(3) UL (ref 0.1–1)
MONOCYTES NFR BLD AUTO: 6 %
NEUTROPHILS # BLD AUTO: 5.41 X10 (3) UL (ref 1.5–7.7)
NEUTROPHILS # BLD AUTO: 5.41 X10(3) UL (ref 1.5–7.7)
NEUTROPHILS NFR BLD AUTO: 64.2 %
NT-PROBNP SERPL-MCNC: 74 PG/ML (ref ?–125)
OSMOLALITY SERPL CALC.SUM OF ELEC: 290 MOSM/KG (ref 275–295)
P AXIS: 21 DEGREES
P-R INTERVAL: 192 MS
PLATELET # BLD AUTO: 214 10(3)UL (ref 150–450)
POTASSIUM SERPL-SCNC: 4.2 MMOL/L (ref 3.5–5.1)
PROT SERPL-MCNC: 7.3 G/DL (ref 5.7–8.2)
Q-T INTERVAL: 366 MS
QRS DURATION: 92 MS
QTC CALCULATION (BEZET): 422 MS
R AXIS: -32 DEGREES
RBC # BLD AUTO: 4.85 X10(6)UL
SODIUM SERPL-SCNC: 134 MMOL/L (ref 136–145)
T AXIS: 11 DEGREES
TROPONIN I SERPL HS-MCNC: 12 NG/L
VENTRICULAR RATE: 80 BPM
WBC # BLD AUTO: 8.4 X10(3) UL (ref 4–11)

## 2025-03-20 PROCEDURE — 71046 X-RAY EXAM CHEST 2 VIEWS: CPT | Performed by: EMERGENCY MEDICINE

## 2025-03-20 PROCEDURE — 93005 ELECTROCARDIOGRAM TRACING: CPT

## 2025-03-20 PROCEDURE — 99285 EMERGENCY DEPT VISIT HI MDM: CPT

## 2025-03-20 PROCEDURE — 82962 GLUCOSE BLOOD TEST: CPT

## 2025-03-20 PROCEDURE — 80053 COMPREHEN METABOLIC PANEL: CPT | Performed by: EMERGENCY MEDICINE

## 2025-03-20 PROCEDURE — 84484 ASSAY OF TROPONIN QUANT: CPT | Performed by: EMERGENCY MEDICINE

## 2025-03-20 PROCEDURE — 70450 CT HEAD/BRAIN W/O DYE: CPT | Performed by: EMERGENCY MEDICINE

## 2025-03-20 PROCEDURE — 93970 EXTREMITY STUDY: CPT | Performed by: EMERGENCY MEDICINE

## 2025-03-20 PROCEDURE — 85025 COMPLETE CBC W/AUTO DIFF WBC: CPT | Performed by: EMERGENCY MEDICINE

## 2025-03-20 PROCEDURE — 96374 THER/PROPH/DIAG INJ IV PUSH: CPT

## 2025-03-20 PROCEDURE — 83880 ASSAY OF NATRIURETIC PEPTIDE: CPT | Performed by: EMERGENCY MEDICINE

## 2025-03-20 PROCEDURE — 93010 ELECTROCARDIOGRAM REPORT: CPT

## 2025-03-20 RX ORDER — SODIUM CHLORIDE 9 MG/ML
INJECTION, SOLUTION INTRAVENOUS CONTINUOUS
Status: DISCONTINUED | OUTPATIENT
Start: 2025-03-20 | End: 2025-03-20

## 2025-03-20 RX ORDER — KETOROLAC TROMETHAMINE 15 MG/ML
15 INJECTION, SOLUTION INTRAMUSCULAR; INTRAVENOUS ONCE
Status: COMPLETED | OUTPATIENT
Start: 2025-03-20 | End: 2025-03-20

## 2025-03-20 RX ORDER — FUROSEMIDE 20 MG/1
20 TABLET ORAL DAILY
Qty: 5 TABLET | Refills: 0 | Status: SHIPPED | OUTPATIENT
Start: 2025-03-20 | End: 2025-03-25

## 2025-03-20 NOTE — ED INITIAL ASSESSMENT (HPI)
Pt reports he noticed swelling to both legs and headache since yesterday. Pt states he has ongoing tingling to both hands which is worse recently. Pt states he is diabetic and has been off his meds for several months.

## 2025-03-20 NOTE — DISCHARGE INSTRUCTIONS
Elevate your legs use compression stocking.  Use the diuretics next couple days.  Watch your sugars carefully.  Restart your metformin, oral hypoglycemic continue your blood pressure medicines.  Elevate your legs return if any chest pain or shortness of breath.  Follow-up with the diabetic counseling, primary care physician return if any severe chest pain, shortness of breath dizziness lightheadedness.    You were seen in the emergency room in a limited time.  There is a possibility that although we do not see any acute process at this present time that things can change with time.  Is therefore imperative that you follow-up with primary care physician for close follow-up.  If there is any significant progression of your pain  or other symptoms you to return immediately to the emergency room.

## 2025-03-20 NOTE — ED PROVIDER NOTES
Patient Seen in: [facility] [place]      History     Chief Complaint   Patient presents with    Swelling Edema    Headache     Stated Complaint: swelling to both legs since yesterday, headache and tingling to hands    Subjective:   HPI      This is a 49-year-old male who was noticed swelling to both legs that started yesterday.  The patient that he is also had a headache with he does have a history of migraines but he decided just was a headache that is seems like a migraine but seems a little stronger than typical migraine.  That started also yesterday.  The patient denies any chest pain, shortness of breath he does have history of hypertension.  He denies any trauma denies any numbness or weakness that you from he has some chronic tingling to both of his fingers which he states he has had it for last 1 month.  He denies any trouble speaking denies any weakness in his upper or lower extremities denies any dizziness lightheadedness denies any chest pain or shortness of breath presently.  Objective:     Past Medical History:    Back problem    Diabetes (HCC)    Essential hypertension    High blood pressure    HTN (hypertension)    Hyperlipidemia    Sleep apnea    Visual impairment    glasses              History reviewed. No pertinent surgical history.             Social History     Socioeconomic History    Marital status: OTHER   Tobacco Use    Smoking status: Former     Current packs/day: 0.00     Average packs/day: 0.5 packs/day for 20.0 years (10.0 ttl pk-yrs)     Types: Cigarettes     Start date: 1996     Quit date: 2016     Years since quittin.8    Smokeless tobacco: Never   Substance and Sexual Activity    Alcohol use: No     Alcohol/week: 0.0 standard drinks of alcohol    Drug use: No                  Physical Exam     ED Triage Vitals [25 0934]   /86   Pulse 86   Resp 20   Temp 97.6 °F (36.4 °C)   Temp src Temporal   SpO2 97 %   O2 Device None (Room air)        Current Vitals:   Vital Signs  BP: 156/88  Pulse: 77  Resp: 18  Temp: 97.6 °F (36.4 °C)  Temp src: Temporal    Oxygen Therapy  SpO2: 99 %  O2 Device: None (Room air)        Physical Exam     General: .  Patient is a pleasant male he is alert and oriented.   The patient is in no respiratory distress. The patient is not septic or toxic    HEENT: Atraumatic, conjunctiva are not pale.  There is no icterus.  Oral mucosa Is wet. The neck is supple. There is no meningismus.  There is no facial asymmetry.    LUNGS: Clear to auscultation, there is no wheezing or retraction.  No crackles.    CV: Cardiovascular is regular without murmurs or rubs.    ABD: The abdomen is soft nondistended nontender.  There is no rebound.  There is no guarding.  Bowel sounds are present.    EXT: There is good pulses bilaterally.  There is no calf tenderness.  There is no rash noted.  There is bilateral pitting edema noted.    NEURO: Alert and oriented x4.      Cranial nerves are grossly intact.       Extraocular muscles are intact.      Muscle strength is 5 out of 5 in both upperand lower extremities    Sensory exam is grossly normal bilaterally upper and lower extremity    There is no pronator drift.    There is normal finger to nose bilaterally.        ED Course     Labs Reviewed   COMP METABOLIC PANEL (14) - Abnormal; Notable for the following components:       Result Value    Glucose 326 (*)     Sodium 134 (*)     Creatinine 0.69 (*)     AST 40 (*)     ALT 65 (*)     All other components within normal limits   POCT GLUCOSE - Abnormal; Notable for the following components:    POC Glucose 286 (*)     All other components within normal limits   POCT GLUCOSE - Abnormal; Notable for the following components:    POC Glucose 270 (*)     All other components within normal limits   TROPONIN I HIGH SENSITIVITY - Normal   PRO BETA NATRIURETIC PEPTIDE - Normal   CBC WITH DIFFERENTIAL WITH PLATELET   RAINBOW DRAW BLUE               The patient was  placed on monitors, IV was started, blood was drawn.    Workup was done to rule out DVT intracranial bleed, mass.  Congestive heart failure was also considered.       MDM      The EKG shows normal sinus rhythm.  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  No acute ST elevation nonspecific ST changes.  The rest of the EKG including rate rhythm axis and intervals I agree with the EKG report . The rate is 80.  When compared to an old EKG from 2017 the septal infarct infarct is unchanged..  QRS duration is 92.        I personally reviewed the radiographs and my individual interpretation shows    No obvious pneumonia.  No congestive heart failure.    Also reviewed official report and it shows    XR CHEST PA + LAT CHEST (CPT=71046)    Result Date: 3/20/2025  PROCEDURE:  XR CHEST PA + LAT CHEST (CPT=71046)  INDICATIONS:  swelling to both legs since yesterday, headache and tingling to hands  COMPARISON:  None.  TECHNIQUE:  PA and lateral chest radiographs were obtained.  PATIENT STATED HISTORY: (As transcribed by Technologist)  Patient complains of a headache and swelling to his bilateral legs X 2 days.  He denies any chest pain or shortness of breath.    FINDINGS:  Minimal left basilar atelectasis.  Cardiac silhouette is normal in size.  No lobar consolidation.  No pneumothorax.  No significant pleural fluid.            CONCLUSION:  See above.   LOCATION:  [facility]   Dictated by (CST): Stromberg, LeRoy, MD on 3/20/2025 at 12:32 PM     Finalized by (CST): Stromberg, LeRoy, MD on 3/20/2025 at 12:33 PM       US VENOUS DOPPLER LEG BILAT - DIAG IMG (CPT=93970)    Result Date: 3/20/2025  PROCEDURE:  US VENOUS DOPPLER LEG BILAT - DIAG IMG (CPT=93970)  COMPARISON:  None.  INDICATIONS:  swelling to both legs since yesterday, headache and tingling to hands  TECHNIQUE:  Real time, grey scale, and duplex ultrasound was used to evaluate the lower extremity venous system. B-mode two-dimensional images of the vascular structures, Doppler  spectral analysis, and color flow.  Doppler imaging were performed.  The following veins were imaged bilaterally:  Common, deep, and superficial femoral, popliteal, sapheno-femoral junction, and posterior tibial veins.  PATIENT STATED HISTORY: (As transcribed by Technologist)  Patient states bilateral leg swelling and tightness in legs for couple of days.  Patient is .    FINDINGS:  THROMBI:  None visible. COMPRESSION:  Normal compressibility, phasicity, and augmentation. OTHER:  Negative.            CONCLUSION:  No acute deep vein thrombosis.   LOCATION:  [place]   Dictated by (CST): Lalo Salamanca MD on 3/20/2025 at 11:54 AM     Finalized by (CST): Lalo Salamanca MD on 3/20/2025 at 11:55 AM       CT BRAIN OR HEAD (CPT=70450)    Result Date: 3/20/2025  PROCEDURE:  CT BRAIN OR HEAD (93244)  COMPARISON:  None.  INDICATIONS:  swelling to both legs since yesterday, headache and tingling to hands  TECHNIQUE:  Noncontrast CT scanning is performed through the brain. Dose reduction techniques were used. Dose information is transmitted to the ACR (American College of Radiology) NRDR (National Radiology Data Registry) which includes the Dose Index Registry.  PATIENT STATED HISTORY: (As transcribed by Technologist)   swelling to both legs since yesterday, headache and tingling to hands x2 days. .    FINDINGS:  The ventricles are normal in size and configuration. There is no evidence of hemorrhage, mass, midline shift, or extra-axial fluid collection.  The visualized paranasal sinuses show no significant sinus disease. . No evidence of depressed skull fracture.            CONCLUSION: No acute intracranial findings.    LOCATION:  [place]   Dictated by (CST): Lalo Salamanca MD on 3/20/2025 at 11:33 AM     Finalized by (CST): Lalo Salamanca MD on 3/20/2025 at 11:35 AM        The patient's repeat blood sugar was 278.  He felt much better he is no findings of DKA.  No acute renal injury I discussed this  case with them extensively I talked to our counselor from  from the hospital she will get him set up for the diabetic counseling.  The patient has been on medications for her or all is oral hypoglycemic but he has not been he has been out of it for several months.  I discussed we can start medication but the dosing was unknown except for the glimepiride I did discuss through this case with the pharmacist who is recommending we start him on metformin as a low-dose he can always start a low dose metformin and can adjust the medications if needed with other medications but I discussed that would be the safest course.  Watch his sugars low-carb diet   Elevate the legs..  I discussed the things to return here including headache, chest pain, shortness of breath after Toradol he felt much better he is neurologically intact on repeat exam he has no dizziness neurologically intact no focal no chest pain or abdominal pain    I discussed with the patient that were seeing them  in a short period of time.  I discussed with them that there is always a possibility that things can change and a need reevaluation with their primary care physician as soon as possible.  I've also discussed with them that if the pain gets worse to return to the emergency room immediately.  Medical Decision Making      Disposition and Plan     Clinical Impression:  1. Hyperglycemia    2. Nonintractable headache, unspecified chronicity pattern, unspecified headache type    3. Edema, unspecified type         Disposition:  Discharge  3/20/2025  1:57 pm    Follow-up:  Leo Smith MD  [address] [facility]  [address]  [facility] [address]  [phone]    Follow up in 2 day(s)      Savannah Carl MD  [facility] [facility]  [address]  [facility] [address]  [phone]    Follow up in 2 day(s)            Medications Prescribed:  Current Discharge Medication List        START taking these medications    Details   furosemide 20 MG Oral Tab Take 1 tablet (20 mg  total) by mouth daily for 5 days.  Qty: 5 tablet, Refills: 0      metFORMIN 500 MG Oral Tab Take 1 tablet (500 mg total) by mouth 2 (two) times daily with meals.  Qty: 60 tablet, Refills: 0                 Supplementary Documentation:

## 2025-03-21 ENCOUNTER — PATIENT OUTREACH (OUTPATIENT)
Dept: CASE MANAGEMENT | Age: 50
End: 2025-03-21

## 2025-03-21 NOTE — PROGRESS NOTES
[facility] Hospital Follow up for PCP(Discharge 3/20 ED[facility])       PCP  Danilo Adams   Family Medicine   [address]    [address]   [facility] [id number] [phone]     Appt made for 3/25@3pm    Confirmed with pt   Closing encounter

## 2025-04-07 ENCOUNTER — OFFICE VISIT (OUTPATIENT)
Dept: FAMILY MEDICINE CLINIC | Facility: CLINIC | Age: 50
End: 2025-04-07
Payer: COMMERCIAL

## 2025-04-07 VITALS
TEMPERATURE: 98 F | HEART RATE: 76 BPM | RESPIRATION RATE: 16 BRPM | SYSTOLIC BLOOD PRESSURE: 144 MMHG | DIASTOLIC BLOOD PRESSURE: 84 MMHG | BODY MASS INDEX: 41.47 KG/M2 | HEIGHT: 69 IN | WEIGHT: 280 LBS | OXYGEN SATURATION: 98 %

## 2025-04-07 DIAGNOSIS — I10 HYPERTENSION, UNSPECIFIED TYPE: ICD-10-CM

## 2025-04-07 DIAGNOSIS — E66.01 MORBID OBESITY WITH BMI OF 45.0-49.9, ADULT (HCC): ICD-10-CM

## 2025-04-07 DIAGNOSIS — E11.9 TYPE 2 DIABETES MELLITUS WITHOUT COMPLICATION, WITHOUT LONG-TERM CURRENT USE OF INSULIN (HCC): Primary | ICD-10-CM

## 2025-04-07 LAB — HEMOGLOBIN A1C: 11.7 % (ref 4.3–5.6)

## 2025-04-07 RX ORDER — BLOOD SUGAR DIAGNOSTIC
1 STRIP MISCELLANEOUS DAILY
COMMUNITY
Start: 2023-01-25

## 2025-04-07 RX ORDER — TIRZEPATIDE 2.5 MG/.5ML
2.5 INJECTION, SOLUTION SUBCUTANEOUS WEEKLY
Qty: 2 ML | Refills: 0 | Status: SHIPPED | OUTPATIENT
Start: 2025-04-07

## 2025-04-07 RX ORDER — KETOROLAC TROMETHAMINE 30 MG/ML
1 INJECTION, SOLUTION INTRAMUSCULAR; INTRAVENOUS DAILY
Qty: 1 EACH | Refills: 1 | Status: SHIPPED | OUTPATIENT
Start: 2025-04-07

## 2025-04-07 RX ORDER — ACYCLOVIR 800 MG/1
1 TABLET ORAL
Qty: 6 EACH | Refills: 1 | Status: SHIPPED | OUTPATIENT
Start: 2025-04-07

## 2025-04-07 NOTE — PROGRESS NOTES
Subjective:   Patient ID: Tunde Sierra is a 49 year old male.    HPI  Mr. Sierra is a pleasant 49-year-old gentleman with history of diabetes mellitus, hypertension, hyperlipidemia, obesity presenting today for emergency room follow-up and to establish care with me.  He presented to the emergency room with bilateral leg swelling.  He has not been taking his medications including metformin and lisinopril-hydrochlorothiazide prior to that visit.  Glucose level at the emergency room was at 300s.    He was prescribed with metformin at the emergency room.  He has been taking lisinopril hydrochlorothiazide at this point for leg swelling had resolved.    He does not smoke cigarettes.  He works as a .    Diabetes mellitus and stroke runs in his family.      I had reviewed past medical and family histories together with allergy and medication lists documented.        History/Other:   Review of Systems   Constitutional:  Negative for fatigue and fever.   HENT:  Negative for sore throat and trouble swallowing.    Respiratory:  Negative for cough and shortness of breath.    Cardiovascular:  Negative for chest pain.   Gastrointestinal:  Negative for abdominal pain, diarrhea, nausea and vomiting.   Neurological:  Negative for dizziness, weakness and headaches.     Current Outpatient Medications   Medication Sig Dispense Refill    Glucose Blood (ONETOUCH VERIO) In Vitro Strip 1 each by Other route daily.      Tirzepatide (MOUNJARO) 2.5 MG/0.5ML Subcutaneous Solution Auto-injector Inject 2.5 mg into the skin once a week. 2 mL 0    Continuous Glucose  (FREESTYLE PARUL 3 READER) Does not apply Device 1 Units daily. 1 each 1    Continuous Glucose Sensor (FREESTYLE PARUL 3 SENSOR) Does not apply Misc 1 each every 14 (fourteen) days. 6 each 1    metFORMIN 500 MG Oral Tab Take 1 tablet (500 mg total) by mouth 2 (two) times daily with meals. 60 tablet 0    atorvastatin 20 MG Oral Tab Take 1 tablet (20 mg total)  by mouth daily.  1    Lisinopril-Hydrochlorothiazide 10-12.5 MG Oral Tab   2     Allergies:Allergies[1]    Objective:   Physical Exam  Vitals reviewed.   Constitutional:       General: He is not in acute distress.  HENT:      Right Ear: Tympanic membrane and ear canal normal.      Left Ear: Tympanic membrane and ear canal normal.      Mouth/Throat:      Mouth: Mucous membranes are moist.      Pharynx: Oropharynx is clear.   Eyes:      General: No scleral icterus.     Conjunctiva/sclera: Conjunctivae normal.   Cardiovascular:      Rate and Rhythm: Normal rate and regular rhythm.      Heart sounds: Normal heart sounds. No murmur heard.  Pulmonary:      Effort: Pulmonary effort is normal. No respiratory distress.      Breath sounds: Normal breath sounds. No wheezing or rales.   Abdominal:      General: Bowel sounds are normal. There is no distension.      Palpations: Abdomen is soft.      Tenderness: There is no abdominal tenderness.   Musculoskeletal:      Cervical back: Neck supple.      Right lower leg: No edema.      Left lower leg: No edema.   Lymphadenopathy:      Cervical: No cervical adenopathy.   Skin:     General: Skin is warm.   Neurological:      General: No focal deficit present.      Mental Status: He is alert.   Psychiatric:         Mood and Affect: Mood normal.         Behavior: Behavior normal.         Thought Content: Thought content normal.         Assessment & Plan:   1. Type 2 diabetes mellitus without complication, without long-term current use of insulin ([facility])   -A1c at 11%  - Encouraged him to maintain a daily calorie deficit and low-carb diet and lose weight  - Will start on Mounjaro 2.5 mg every week  - Continue metformin  - Watch for side effects from Mounjaro which include lightheadedness, dizziness, nausea, vomiting, abdominal pain, diarrhea, constipation, allergic reaction  - Follow-up after 4 weeks after starting this medication   2. Hypertension, unspecified type   -Plan as above  mentioned  - Continue statin  - Increase lisinopril hydrochlorothiazide to milligrams-25 mg daily  - Monitor blood pressure   3. Morbid obesity with BMI of 45.0-49.9, adult (HCC)   -Please see #1 for recommendations   Follow-up in 4 weeks or as needed      This note was prepared using Dragon Medical voice recognition dictation software. As a result errors may occur. When identified these errors have been corrected. While every attempt is made to correct errors during dictation discrepancies may still exist          Orders Placed This Encounter   Procedures    CBC With Differential With Platelet    Comp Metabolic Panel (14)    Lipid Panel    TSH and Free T4    Hemoglobin A1C [E]    Microalb/Creat Ratio, Random Urine [E]       Meds This Visit:  Requested Prescriptions     Signed Prescriptions Disp Refills    Tirzepatide (MOUNJARO) 2.5 MG/0.5ML Subcutaneous Solution Auto-injector 2 mL 0     Sig: Inject 2.5 mg into the skin once a week.    Continuous Glucose  (FREESTYLE PARUL 3 READER) Does not apply Device 1 each 1     Si Units daily.    Continuous Glucose Sensor (FREESTYLE PARUL 3 SENSOR) Does not apply Misc 6 each 1     Si each every 14 (fourteen) days.       Imaging & Referrals:  None         [1] No Known Allergies

## 2025-04-07 NOTE — PATIENT INSTRUCTIONS
Thank you for choosing Danilo Adams MD at [facility]  To Do: Tunde Sierra  1. Please see below   Call [phone] to schedule the appointment.   Please signup for [employer], which is electronic access to your record if you have not done so.  All your results will post on there.  [email or web address]   You can NOW use [employer] to book your appointments with us, or consider using open access scheduling which is through the [place] website [email or web address] and type in Danilo Adams MD and follow the links for \"Schedule Online Now\"    To schedule Imaging or tests at [place] call Central Scheduling [phone], Go to [facility] A ER Building (For example: CT scans, X rays, Ultrasound, MRI)  Cardiac Testing in ER building Building A second floor Cardiac Testing [phone] (For example: Holter Monitor, Cardiac Stress tests,Event Monitor, or 2D Echocardiograms)  [facility] call [phone] usually in [facility] A  Walk in Clinic in [place] at [address] Mon-Fri at 8am-7:30 p.m., and Sat/Sun 9:00a.m.-4:30 p.m.  Also at [address] [place] [address]  Call [phone] for info     Please call our office about any questions regarding your treatment/medicines/tests as a result of today's visit.  For your safety, read the entire package insert of all medicines prescribed to you and be aware of all of the risks of treatment even beyond those discussed today.  All therapies have potential risk of harm or side effects or medication interactions.  It is your duty and for your safety to discuss with the pharmacist and our office with questions, and to notify us and stop treatment if problems arise, but know that our intention is that the benefits outweigh those potential risks and we strive to make you healthier and to improve your quality of life.    Referrals, and Radiology Information:    If your insurance requires a referral to a specialist, please allow 5 business days to process  your referral request.    If Danilo Adams MD orders a CT or MRI, it may take up to 10 business days to receive approval from your insurance company. Once our office has called informing you that the insurance company approved your testing, please call Central Scheduling at [phone]  Please allow our office 5 business days to contact you regarding any testing results.    Refill policies:   Allow 3 business days for refills; controlled substances may take longer and must be picked up from the office in person.  Narcotic medications can only be filled in 30 day increments and must be refilled at an office visit only.  If your prescription is due for a refill, you may be due for a follow-up appointment.  We cannot refill your maintenance medications at a preventative wellness visit.  To best provide you care, patients receiving maintenance medications need to be seen at least twice a year.

## 2025-05-04 DIAGNOSIS — E11.9 TYPE 2 DIABETES MELLITUS WITHOUT COMPLICATION, WITHOUT LONG-TERM CURRENT USE OF INSULIN (HCC): ICD-10-CM

## 2025-05-05 RX ORDER — LISINOPRIL AND HYDROCHLOROTHIAZIDE 10; 12.5 MG/1; MG/1
1 TABLET ORAL DAILY
Qty: 90 TABLET | Refills: 2 | Status: SHIPPED | OUTPATIENT
Start: 2025-05-05

## 2025-05-05 RX ORDER — TIRZEPATIDE 2.5 MG/.5ML
2.5 INJECTION, SOLUTION SUBCUTANEOUS WEEKLY
Qty: 2 ML | Refills: 0 | Status: SHIPPED | OUTPATIENT
Start: 2025-05-05

## 2025-05-05 RX ORDER — ATORVASTATIN CALCIUM 20 MG/1
20 TABLET, FILM COATED ORAL DAILY
Qty: 90 TABLET | Refills: 1 | Status: SHIPPED | OUTPATIENT
Start: 2025-05-05

## 2025-05-06 ENCOUNTER — OFFICE VISIT (OUTPATIENT)
Dept: FAMILY MEDICINE CLINIC | Facility: CLINIC | Age: 50
End: 2025-05-06
Payer: COMMERCIAL

## 2025-05-06 VITALS
OXYGEN SATURATION: 98 % | TEMPERATURE: 98 F | SYSTOLIC BLOOD PRESSURE: 144 MMHG | DIASTOLIC BLOOD PRESSURE: 82 MMHG | BODY MASS INDEX: 39.84 KG/M2 | HEART RATE: 76 BPM | WEIGHT: 269 LBS | HEIGHT: 69 IN | RESPIRATION RATE: 16 BRPM

## 2025-05-06 DIAGNOSIS — E11.9 TYPE 2 DIABETES MELLITUS WITHOUT COMPLICATION, WITHOUT LONG-TERM CURRENT USE OF INSULIN (HCC): Primary | ICD-10-CM

## 2025-05-06 DIAGNOSIS — I10 HYPERTENSION, UNSPECIFIED TYPE: ICD-10-CM

## 2025-05-06 DIAGNOSIS — E66.01 MORBID OBESITY WITH BMI OF 45.0-49.9, ADULT (HCC): ICD-10-CM

## 2025-05-06 PROCEDURE — 3008F BODY MASS INDEX DOCD: CPT | Performed by: FAMILY MEDICINE

## 2025-05-06 PROCEDURE — 99215 OFFICE O/P EST HI 40 MIN: CPT | Performed by: FAMILY MEDICINE

## 2025-05-06 PROCEDURE — G2211 COMPLEX E/M VISIT ADD ON: HCPCS | Performed by: FAMILY MEDICINE

## 2025-05-06 PROCEDURE — 3077F SYST BP >= 140 MM HG: CPT | Performed by: FAMILY MEDICINE

## 2025-05-06 PROCEDURE — 3079F DIAST BP 80-89 MM HG: CPT | Performed by: FAMILY MEDICINE

## 2025-05-06 RX ORDER — LISINOPRIL AND HYDROCHLOROTHIAZIDE 20; 25 MG/1; MG/1
1 TABLET ORAL DAILY
Qty: 90 TABLET | Refills: 1 | Status: SHIPPED | OUTPATIENT
Start: 2025-05-06

## 2025-05-06 RX ORDER — TIRZEPATIDE 2.5 MG/.5ML
2.5 INJECTION, SOLUTION SUBCUTANEOUS WEEKLY
Qty: 2 ML | Refills: 0 | Status: CANCELLED | OUTPATIENT
Start: 2025-05-06

## 2025-05-06 RX ORDER — TIRZEPATIDE 5 MG/.5ML
5 INJECTION, SOLUTION SUBCUTANEOUS WEEKLY
Qty: 2 ML | Refills: 0 | Status: SHIPPED | OUTPATIENT
Start: 2025-05-06

## 2025-05-06 NOTE — PATIENT INSTRUCTIONS
Thank you for choosing Danilo Adams MD at [facility]  To Do: Tunde Sierra  1. Please see below   Call [phone] to schedule the appointment.   Please signup for [employer], which is electronic access to your record if you have not done so.  All your results will post on there.  [email or web address]   You can NOW use [employer] to book your appointments with us, or consider using open access scheduling which is through the [place] website [email or web address] and type in Danilo Adams MD and follow the links for \"Schedule Online Now\"    To schedule Imaging or tests at [place] call Central Scheduling [phone], Go to [facility] A ER Building (For example: CT scans, X rays, Ultrasound, MRI)  Cardiac Testing in ER building Building A second floor Cardiac Testing [phone] (For example: Holter Monitor, Cardiac Stress tests,Event Monitor, or 2D Echocardiograms)  [facility] call [phone] usually in [facility] A  Walk in Clinic in [place] at [address] Mon-Fri at 8am-7:30 p.m., and Sat/Sun 9:00a.m.-4:30 p.m.  Also at [address] [place] [address]  Call [phone] for info     Please call our office about any questions regarding your treatment/medicines/tests as a result of today's visit.  For your safety, read the entire package insert of all medicines prescribed to you and be aware of all of the risks of treatment even beyond those discussed today.  All therapies have potential risk of harm or side effects or medication interactions.  It is your duty and for your safety to discuss with the pharmacist and our office with questions, and to notify us and stop treatment if problems arise, but know that our intention is that the benefits outweigh those potential risks and we strive to make you healthier and to improve your quality of life.    Referrals, and Radiology Information:    If your insurance requires a referral to a specialist, please allow 5 business days to process  your referral request.    If Danilo Adams MD orders a CT or MRI, it may take up to 10 business days to receive approval from your insurance company. Once our office has called informing you that the insurance company approved your testing, please call Central Scheduling at [phone]  Please allow our office 5 business days to contact you regarding any testing results.    Refill policies:   Allow 3 business days for refills; controlled substances may take longer and must be picked up from the office in person.  Narcotic medications can only be filled in 30 day increments and must be refilled at an office visit only.  If your prescription is due for a refill, you may be due for a follow-up appointment.  We cannot refill your maintenance medications at a preventative wellness visit.  To best provide you care, patients receiving maintenance medications need to be seen at least twice a year.

## 2025-05-06 NOTE — PROGRESS NOTES
Subjective:   Patient ID: Tunde Sierra is a 49 year old male.    HPI  Mr. Sierra is a pleasant 49-year-old gentleman with history of diabetes mellitus, hypertension, hyperlipidemia, obesity presenting today for his follow-up appointment.  He was started on Mounjaro 2.5 mg every week and does not report side effects.  He had lost 11 pounds.  He had lowered his calories and carbs.  He feels well overall.  No fever no cough no chest pain no shortness of breath no nausea no vomiting no abdominal pain no diarrhea.  I did ask him to take 2 tablets of his lisinopril-hydrochlorothiazide.  Blood pressure is mildly elevated.    Previous A1c was above 11%.    I had reviewed past medical and family histories together with allergy and medication lists documented.    History/Other:   Review of Systems  Constitutional:  Negative for fatigue and fever.   HENT:  Negative for sore throat and trouble swallowing.    Respiratory:  Negative for cough and shortness of breath.    Cardiovascular:  Negative for chest pain.   Gastrointestinal:  Negative for abdominal pain, diarrhea, nausea and vomiting.   Neurological:  Negative for dizziness, weakness and headaches.      Current Medications[1]  Allergies:Allergies[2]    Objective:   Physical Exam  Vitals reviewed.   Constitutional:       General: He is not in acute distress.  HENT:        Mouth/Throat:      Mouth: Mucous membranes are moist.      Pharynx: Oropharynx is clear.   Eyes:      General: No scleral icterus.     Conjunctiva/sclera: Conjunctivae normal.   Cardiovascular:      Rate and Rhythm: Normal rate and regular rhythm.      Heart sounds: Normal heart sounds. No murmur heard.  Pulmonary:      Effort: Pulmonary effort is normal. No respiratory distress.      Breath sounds: Normal breath sounds. No wheezing or rales.   Abdominal:      General: Bowel sounds are normal. There is no distension.      Palpations: Abdomen is soft.      Tenderness: There is no abdominal tenderness.    Musculoskeletal:      Cervical back: Neck supple.      Right lower leg: No edema.      Left lower leg: No edema.   Lymphadenopathy:      Cervical: No cervical adenopathy.   Skin:     General: Skin is warm.   Neurological:      General: No focal deficit present.      Mental Status: He is alert.   Psychiatric:         Mood and Affect: Mood normal.         Behavior: Behavior normal.         Thought Content: Thought content normal.      Assessment & Plan:   1. Type 2 diabetes mellitus without complication, without long-term current use of insulin ([facility])   -Will recheck labs including A1c and microalbumin in 2 months  - Will increase Mounjaro to 5 mg every week.  -Continue to maintain low carb and maintain low calorie deficit  Follow-up in 2 months     2. Hypertension, unspecified type   - Elevated  - Continue meds     3. Morbid obesity with BMI of 45.0-49.9, adult ([facility])   - Please see #1     He had picked up his Mounjaro 2.5 mg from his pharmacy this week.  I did ask him to finish this dose and moved to 5 mg thereafter which was sent.    This note was prepared using Dragon Medical voice recognition dictation software. As a result errors may occur. When identified these errors have been corrected. While every attempt is made to correct errors during dictation discrepancies may still exist          No orders of the defined types were placed in this encounter.      Meds This Visit:  Requested Prescriptions     Signed Prescriptions Disp Refills    lisinopril-hydroCHLOROthiazide 20-25 MG Oral Tab 90 tablet 1     Sig: Take 1 tablet by mouth daily.    Tirzepatide (MOUNJARO) 5 MG/0.5ML Subcutaneous Solution Auto-injector 2 mL 0     Sig: Inject 5 mg into the skin once a week.       Imaging & Referrals:  None         [1]   Current Outpatient Medications   Medication Sig Dispense Refill    lisinopril-hydroCHLOROthiazide 20-25 MG Oral Tab Take 1 tablet by mouth daily. 90 tablet 1    Tirzepatide (MOUNJARO) 5 MG/0.5ML Subcutaneous  Solution Auto-injector Inject 5 mg into the skin once a week. 2 mL 0    atorvastatin 20 MG Oral Tab Take 1 tablet (20 mg total) by mouth daily. 90 tablet 1    Glucose Blood (ONETOUCH VERIO) In Vitro Strip 1 each by Other route daily.     [2] No Known Allergies

## 2025-06-16 DIAGNOSIS — E11.9 TYPE 2 DIABETES MELLITUS WITHOUT COMPLICATION, WITHOUT LONG-TERM CURRENT USE OF INSULIN (HCC): ICD-10-CM

## 2025-06-16 DIAGNOSIS — E66.01 MORBID OBESITY WITH BMI OF 45.0-49.9, ADULT (HCC): ICD-10-CM

## 2025-06-17 RX ORDER — TIRZEPATIDE 5 MG/.5ML
5 INJECTION, SOLUTION SUBCUTANEOUS WEEKLY
Qty: 2 ML | Refills: 0 | Status: SHIPPED | OUTPATIENT
Start: 2025-06-17

## 2025-06-17 NOTE — TELEPHONE ENCOUNTER
Requesting Mounjaro 5mg  LOV: 5/6/25  RTC: 2 months  Last Relevant Labs: 4/7/25  Filled: 5/6/25 #2mL with 0 refills    Future Appointments   Date Time Provider Department Center   7/7/2025  3:00 PM Danilo dAams MD [facility] [address] [facility] [address]     Diabetes Medication Protocol [id number]/16/2025 07:41 PM   Protocol Details   Last A1C < 7.5 and within past 6 months    Microalbumin procedure in past 12 months or taking ACE/ARB    In person appointment or virtual visit in the past 6 mos or appointment in next 3 mos    EGFRCR or GFRNAA > 50    GFR in the past 12 months    Medication is active on med list     Rx sent to pharmacy per protocol

## 2025-07-07 ENCOUNTER — OFFICE VISIT (OUTPATIENT)
Dept: FAMILY MEDICINE CLINIC | Facility: CLINIC | Age: 50
End: 2025-07-07
Payer: COMMERCIAL

## 2025-07-07 VITALS
HEART RATE: 74 BPM | RESPIRATION RATE: 16 BRPM | BODY MASS INDEX: 40.29 KG/M2 | HEIGHT: 69 IN | TEMPERATURE: 98 F | SYSTOLIC BLOOD PRESSURE: 134 MMHG | OXYGEN SATURATION: 98 % | DIASTOLIC BLOOD PRESSURE: 80 MMHG | WEIGHT: 272 LBS

## 2025-07-07 DIAGNOSIS — E11.9 TYPE 2 DIABETES MELLITUS WITHOUT COMPLICATION, WITHOUT LONG-TERM CURRENT USE OF INSULIN (HCC): Primary | ICD-10-CM

## 2025-07-07 DIAGNOSIS — E66.01 MORBID OBESITY WITH BMI OF 45.0-49.9, ADULT (HCC): ICD-10-CM

## 2025-07-07 DIAGNOSIS — L98.9 FINGER LESION: ICD-10-CM

## 2025-07-07 DIAGNOSIS — I10 HYPERTENSION, UNSPECIFIED TYPE: ICD-10-CM

## 2025-07-07 LAB — HEMOGLOBIN A1C: 9 % (ref 4.3–5.6)

## 2025-07-07 RX ORDER — GLYBURIDE 5 MG/1
5 TABLET ORAL
COMMUNITY
End: 2025-07-07

## 2025-07-07 RX ORDER — GLIMEPIRIDE 4 MG/1
4 TABLET ORAL
COMMUNITY

## 2025-07-07 RX ORDER — TIRZEPATIDE 7.5 MG/.5ML
7.5 INJECTION, SOLUTION SUBCUTANEOUS WEEKLY
Qty: 6 ML | Refills: 0 | Status: SHIPPED | OUTPATIENT
Start: 2025-07-07

## 2025-07-07 RX ORDER — KETOROLAC TROMETHAMINE 5 MG/ML
SOLUTION OPHTHALMIC
COMMUNITY
Start: 2025-06-16

## 2025-07-07 NOTE — PROGRESS NOTES
Subjective:   Patient ID: Tunde Sierra is a 49 year old male.    HPI  Mr. Sierra is a pleasant 49-year-old gentleman with history of diabetes mellitus, hypertension, hyperlipidemia, obesity presenting today for his follow-up appointment.  He is currently on Mounjaro 5 mg at night.  He is also on glimepiride as prescribed by his previous physician.  He has been steadily losing weight.  Previous A1c was 11%.  No side effects from taking this medication.  He has a lesion that he had noted on his left middle finger.  No discharge nor pain that he reports.    I had reviewed past medical and family histories together with allergy and medication lists documented.    History/Other:   Review of Systems  Constitutional:  Negative for fatigue and fever.   HENT:  Negative for sore throat and trouble swallowing.    Respiratory:  Negative for cough and shortness of breath.    Cardiovascular:  Negative for chest pain.   Gastrointestinal:  Negative for abdominal pain, diarrhea, nausea and vomiting.   Neurological:  Negative for dizziness, weakness and headaches.      Current Medications[1]  Allergies:Allergies[2]    Objective:   Physical Exam  Vitals reviewed.   Constitutional:       General: He is not in acute distress.  HENT:        Mouth/Throat:      Mouth: Mucous membranes are moist.      Pharynx: Oropharynx is clear.   Eyes:      General: No scleral icterus.     Conjunctiva/sclera: Conjunctivae normal.   Cardiovascular:      Rate and Rhythm: Normal rate and regular rhythm.      Heart sounds: Normal heart sounds. No murmur heard.  Pulmonary:      Effort: Pulmonary effort is normal. No respiratory distress.      Breath sounds: Normal breath sounds. No wheezing or rales.   Abdominal:      General: Bowel sounds are normal. There is no distension.      Palpations: Abdomen is soft.      Tenderness: There is no abdominal tenderness.   Musculoskeletal:      Cervical back: Neck supple.      Right lower leg: No edema.      Left  lower leg: No edema.   Examination of the left third finger reveals a raised lesion on the medial aspect adjacent to the left third DIP which measured approximately less than 1 cm in size with no erythema no warmth no tenderness no discharge.  Lymphadenopathy:      Cervical: No cervical adenopathy.   Skin:     General: Skin is warm.   Neurological:      General: No focal deficit present.      Mental Status: He is alert.   Psychiatric:         Mood and Affect: Mood normal.         Behavior: Behavior normal.         Thought Content: Thought content normal.   Assessment & Plan:   1. Type 2 diabetes mellitus without complication, without long-term current use of insulin (HCC)   -Trending down  - Will increase Mounjaro to 7.5 mg every week for the next 3 months  - A1c done in the office which is 9%  - Will check routine labs including A1c in 3 months  - Follow-up in 3 months  -Continue to lower carbs in his diet and daily calorie deficit   2. Morbid obesity with BMI of 45.0-49.9, adult (HCC)   -Please see #1   3. Hypertension, unspecified type   -Well-controlled  - Continue meds   4. Finger lesion   - Will refer to dermatology for further evaluation management     This note was prepared using Dragon Medical voice recognition dictation software. As a result errors may occur. When identified these errors have been corrected. While every attempt is made to correct errors during dictation discrepancies may still exist          Orders Placed This Encounter   Procedures    POC Hemoglobin A1C       Meds This Visit:  Requested Prescriptions     Signed Prescriptions Disp Refills    Tirzepatide (MOUNJARO) 7.5 MG/0.5ML Subcutaneous Solution Auto-injector 6 mL 0     Sig: Inject 7.5 mg into the skin once a week.       Imaging & Referrals:  [facility] - INTERNAL         [1]   Current Outpatient Medications   Medication Sig Dispense Refill    ketorolac 0.5 % Ophthalmic Solution PLACE 1 DROP INTO THE LEFT EYE FOUR TIMES DAILY FOR 7 DAYS       glimepiride 4 MG Oral Tab Take 1 tablet (4 mg total) by mouth every morning before breakfast.      Tirzepatide (MOUNJARO) 7.5 MG/0.5ML Subcutaneous Solution Auto-injector Inject 7.5 mg into the skin once a week. 6 mL 0    lisinopril-hydroCHLOROthiazide 20-25 MG Oral Tab Take 1 tablet by mouth daily. 90 tablet 1    atorvastatin 20 MG Oral Tab Take 1 tablet (20 mg total) by mouth daily. 90 tablet 1    Glucose Blood (ONETOUCH VERIO) In Vitro Strip 1 each by Other route daily.     [2] No Known Allergies

## 2025-07-07 NOTE — PATIENT INSTRUCTIONS
Thank you for choosing Danilo Adams MD at [facility]  To Do: Tunde Sierra  1. Please see below   Call [phone] to schedule the appointment.   Please signup for [employer], which is electronic access to your record if you have not done so.  All your results will post on there.  [email or web address]   You can NOW use [employer] to book your appointments with us, or consider using open access scheduling which is through the [place] website [email or web address] and type in Danilo Adams MD and follow the links for \"Schedule Online Now\"    To schedule Imaging or tests at [place] call Central Scheduling [phone], Go to [facility] A ER Building (For example: CT scans, X rays, Ultrasound, MRI)  Cardiac Testing in ER building Building A second floor Cardiac Testing [phone] (For example: Holter Monitor, Cardiac Stress tests,Event Monitor, or 2D Echocardiograms)  [facility] call [phone] usually in [facility] A  Walk in Clinic in [place] at [address] Mon-Fri at 8am-7:30 p.m., and Sat/Sun 9:00a.m.-4:30 p.m.  Also at [address] [place] [address]  Call [phone] for info     Please call our office about any questions regarding your treatment/medicines/tests as a result of today's visit.  For your safety, read the entire package insert of all medicines prescribed to you and be aware of all of the risks of treatment even beyond those discussed today.  All therapies have potential risk of harm or side effects or medication interactions.  It is your duty and for your safety to discuss with the pharmacist and our office with questions, and to notify us and stop treatment if problems arise, but know that our intention is that the benefits outweigh those potential risks and we strive to make you healthier and to improve your quality of life.    Referrals, and Radiology Information:    If your insurance requires a referral to a specialist, please allow 5 business days to process  your referral request.    If Danilo Adams MD orders a CT or MRI, it may take up to 10 business days to receive approval from your insurance company. Once our office has called informing you that the insurance company approved your testing, please call Central Scheduling at [phone]  Please allow our office 5 business days to contact you regarding any testing results.    Refill policies:   Allow 3 business days for refills; controlled substances may take longer and must be picked up from the office in person.  Narcotic medications can only be filled in 30 day increments and must be refilled at an office visit only.  If your prescription is due for a refill, you may be due for a follow-up appointment.  We cannot refill your maintenance medications at a preventative wellness visit.  To best provide you care, patients receiving maintenance medications need to be seen at least twice a year.

## (undated) DEVICE — SUTURE CHROMIC GUT 3-0 SH

## (undated) DEVICE — MINOR GENERAL: Brand: MEDLINE INDUSTRIES, INC.

## (undated) DEVICE — CAUTERY: TIP CLEANER XR 100/CS: Brand: MEDICAL ACTION INDUSTRIES

## (undated) DEVICE — SOL  .9 1000ML BTL

## (undated) DEVICE — NEEDLE HPO 27GA .5IN BD REG

## (undated) DEVICE — TOWEL OR BLU 16X26 STRL

## (undated) DEVICE — PETROLATUM GAUZE CISION DRESSING: Brand: VASELINE

## (undated) DEVICE — DRAPE SHEET LAPAROTOMY

## (undated) DEVICE — STANDARD HYPODERMIC NEEDLE,POLYPROPYLENE HUB: Brand: MONOJECT

## (undated) DEVICE — TRAY SRGPRP PVP IOD WT SCRB SM

## (undated) DEVICE — TIBURON NEONATAL DRAPE: Brand: CONVERTORS

## (undated) DEVICE — CONMED ACCESSORY ELECTRODE, NEEDLE ELECTRODE

## (undated) DEVICE — DRESSING PETRO 18X3IN ABS NADH

## (undated) DEVICE — STERILE LATEX POWDER-FREE SURGICAL GLOVESWITH NITRILE COATING: Brand: PROTEXIS

## (undated) NOTE — Clinical Note
May 17, 2017         Jaki Mitchell MD  [address]      Patient: Terra Delgado   YOB: 1975   Date of Visit: 5/16/2017       Dear Dr. Shabbir Hernández,     Many thanks for referring Terra Delgado to my prac anesthesia due to pt's weight.     (G47.30) Sleep apnea, unspecified type   Plan: Pt uses CPAP. Condition increases risk with anesthesia during surgical procedures. RECOMMENDATIONS AND TREATMENT PLAN   1.  Proceed with plans for circumcision under gener

## (undated) NOTE — IP AVS SNAPSHOT
[facility] - [facility]    [address], [place], [place] ~ [phone]                Discharge Summary   6/6/2017    Lance Montiel           Admission Information        Provider Department    6/6/2017 Danielle Sheikh MD [facility]\" ([facility]). Questionnaires will be mailed to randomly selected surgery patients 30 days after their surgery.       If you receive a questionnaire about your surgery, please take a few minutes to answer the questions and return in the provided self-a cramps, gas pains or a bloated feeling in your abdomen. · To experience mild back pain or soreness for a day or two if you had spinal or epidural anesthesia. · If you had laparoscopic surgery, to feel shoulder pain or discomfort on the day of surgery. 10. 1 -- --      Metabolic Lab Results  (Last result in the past 90 days)    ALT Bilirubin,Total Total Protein Albumin Sodium Potassium Chloride    -- -- -- -- (05/16/17)  141 (05/16/17)  4.2 (05/16/17)  99      Radiology Exams     None         Additional I If you have questions, you can call [phone] to talk to our [facility] Staff. Remember, MyChart is NOT to be used for urgent needs. For medical emergencies, dial 911.

## (undated) NOTE — Clinical Note
No referring provider defined for this encounter. 05/16/2017        Patient: Crystal Jacobsen   YOB: 1975   Date of Visit: 5/16/2017       Dear  Dr. Librado Washburn MD,      Many thanks for referring Crystal Jacobsen to my practice.   P surgical procedures. RECOMMENDATIONS AND TREATMENT PLAN   1.  Proceed with plans for circumcision under general anesthesia at [facility] will plan that you would go home the same day although there is a small chance that you may have t

## (undated) NOTE — MR AVS SNAPSHOT
[facility]  [address]  [phone]               Thank you for choosing us for your health care visit with Eden Willett MD.  We are glad to serve you and happy to provide you with this summ overweight--basic metabolic, CBC, EKG    7.   Please bring your sleep apnea machine to the hospital for the procedure       Allergies as of May 16, 2017     No Known Allergies                Today's Vital Signs     BP Pulse Temp Height Weight BMI    138/90 dairy products with reduced content of saturated and total fat.    Dietary sodium reduction Reduce dietary sodium intake to <= 100 mmol per day (2.4 g sodium or 6 g sodium chloride)   Aerobic physical activity Regular aerobic physical activity (e.g., brisk Visit [facility] online at  [email or web address]